# Patient Record
Sex: FEMALE | Race: WHITE | Employment: STUDENT | URBAN - METROPOLITAN AREA
[De-identification: names, ages, dates, MRNs, and addresses within clinical notes are randomized per-mention and may not be internally consistent; named-entity substitution may affect disease eponyms.]

---
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
## 2024-09-18 ENCOUNTER — OFFICE VISIT (OUTPATIENT)
Dept: URGENT CARE | Age: 19
End: 2024-09-18
Payer: COMMERCIAL

## 2024-09-18 VITALS
WEIGHT: 135 LBS | DIASTOLIC BLOOD PRESSURE: 69 MMHG | BODY MASS INDEX: 22.49 KG/M2 | HEIGHT: 65 IN | SYSTOLIC BLOOD PRESSURE: 110 MMHG | TEMPERATURE: 98.3 F | HEART RATE: 69 BPM | RESPIRATION RATE: 16 BRPM | OXYGEN SATURATION: 99 %

## 2024-09-18 DIAGNOSIS — J02.9 SORE THROAT: ICD-10-CM

## 2024-09-18 DIAGNOSIS — R59.1 LYMPHADENOPATHY: Primary | ICD-10-CM

## 2024-09-18 LAB — POC RAPID STREP: NEGATIVE

## 2024-09-18 RX ORDER — DEXAMETHASONE 6 MG/1
6 TABLET ORAL DAILY
Qty: 5 TABLET | Refills: 0 | Status: SHIPPED | OUTPATIENT
Start: 2024-09-18 | End: 2024-09-23

## 2024-09-18 RX ORDER — SERTRALINE HYDROCHLORIDE 50 MG/1
50 TABLET, FILM COATED ORAL DAILY
COMMUNITY

## 2024-09-18 RX ORDER — AMOXICILLIN 500 MG/1
500 CAPSULE ORAL 2 TIMES DAILY
Qty: 14 CAPSULE | Refills: 0 | Status: SHIPPED | OUTPATIENT
Start: 2024-09-18 | End: 2024-09-25

## 2024-09-18 ASSESSMENT — ENCOUNTER SYMPTOMS
SORE THROAT: 1
VOICE CHANGE: 0
FATIGUE: 1
URTICARIA: 1
HEADACHES: 1
TROUBLE SWALLOWING: 0

## 2024-09-18 NOTE — PROGRESS NOTES
"Subjective   Patient ID: Bonny Hung is a 19 y.o. female. They present today with a chief complaint of Sore Throat (Started 3 days ago ), Hives, Generalized Body Aches, and Headache.    History of Present Illness    Sore Throat   Associated symptoms include congestion and headaches. Pertinent negatives include no trouble swallowing.   Hives  Associated symptoms: congestion, fatigue, headaches and sore throat    Headache  Associated symptoms: congestion, fatigue and sore throat        Pt presents to urgent care with c/o sore throat, swollen glands, body aches, headache, intermittent itchy rash.  Pt denies CP, SOB, palpitations, fevers, abd pain, n/v/d, sick contacts, recent travel.        Past Medical History  Allergies as of 09/18/2024    (No Known Allergies)       (Not in a hospital admission)       No past medical history on file.    No past surgical history on file.     reports that she has never smoked. She has never been exposed to tobacco smoke. She has never used smokeless tobacco.    Review of Systems  Review of Systems   Constitutional:  Positive for fatigue.   HENT:  Positive for congestion and sore throat. Negative for trouble swallowing and voice change.    Neurological:  Positive for headaches.                                  Objective    Vitals:    09/18/24 1711   BP: 110/69   BP Location: Right arm   Patient Position: Sitting   BP Cuff Size: Adult   Pulse: 69   Resp: 16   Temp: 36.8 °C (98.3 °F)   SpO2: 99%   Weight: 61.2 kg (135 lb)   Height: 1.651 m (5' 5\")     Patient's last menstrual period was 09/18/2024 (exact date).    Physical Exam  Vitals and nursing note reviewed.   Constitutional:       General: She is not in acute distress.     Appearance: Normal appearance. She is not ill-appearing or toxic-appearing.   HENT:      Head: Atraumatic.      Right Ear: Tympanic membrane, ear canal and external ear normal.      Left Ear: Tympanic membrane, ear canal and external ear normal.      " Nose: Nose normal.      Mouth/Throat:      Mouth: Mucous membranes are moist.      Pharynx: Oropharynx is clear. Posterior oropharyngeal erythema present. No oropharyngeal exudate.   Eyes:      Extraocular Movements: Extraocular movements intact.      Conjunctiva/sclera: Conjunctivae normal.      Pupils: Pupils are equal, round, and reactive to light.   Cardiovascular:      Rate and Rhythm: Normal rate and regular rhythm.      Pulses: Normal pulses.      Heart sounds: Normal heart sounds.   Pulmonary:      Effort: Pulmonary effort is normal.      Breath sounds: Normal breath sounds.   Abdominal:      General: Abdomen is flat. Bowel sounds are normal.      Palpations: Abdomen is soft.      Tenderness: There is no abdominal tenderness.   Musculoskeletal:         General: Normal range of motion.      Cervical back: Normal range of motion and neck supple. No tenderness.   Skin:     General: Skin is warm and dry.      Capillary Refill: Capillary refill takes less than 2 seconds.   Neurological:      General: No focal deficit present.      Mental Status: She is alert and oriented to person, place, and time.   Psychiatric:         Mood and Affect: Mood normal.         Behavior: Behavior normal.         Thought Content: Thought content normal.         Procedures    Point of Care Test & Imaging Results from this visit  Results for orders placed or performed in visit on 09/18/24   POCT rapid strep A manually resulted   Result Value Ref Range    POC Rapid Strep Negative Negative      No results found.    Diagnostic study results (if any) were reviewed by MAGNUS Pelletier.    Assessment/Plan   Allergies, medications, history, and pertinent labs/EKGs/Imaging reviewed by MAGNUS Pelletier.     Medical Decision Making  Pt presents with headache, body aches, sore throat.  Posterior oropharynx with erythema, bilateral tonsillar swelling, no exudate.  Low suspicion for peritonsillar abscess given lack of unilateral  tonsillar swelling.  Pt with mildly swollen lymph nodes bilaterally in anterior neck.  No tenderness to palpation.  Rapid strep negative.  Given appearance of throat, CENTOR criteria, will treat for strep despite negative testing.  Will also prescribe steroids for inflammation.   At time of discharge patient was clinically well-appearing and HDS for outpatient management. The patient and/or family was educated regarding diagnosis, supportive care, OTC and Rx medications. The patient and/or family was given the opportunity to ask questions prior to discharge.  They verbalized understanding of my discussion of the plans for treatment, expected course, indications to return to  or seek further evaluation in ED, and the need for timely follow up as directed.   They were provided with a work/school excuse if requested.       Orders and Diagnoses  Diagnoses and all orders for this visit:  Lymphadenopathy  -     amoxicillin (Amoxil) 500 mg capsule; Take 1 capsule (500 mg) by mouth 2 times a day for 7 days.  -     dexAMETHasone (Decadron) 6 mg tablet; Take 1 tablet (6 mg) by mouth once daily for 5 days.  Sore throat  -     POCT rapid strep A manually resulted  -     amoxicillin (Amoxil) 500 mg capsule; Take 1 capsule (500 mg) by mouth 2 times a day for 7 days.  -     dexAMETHasone (Decadron) 6 mg tablet; Take 1 tablet (6 mg) by mouth once daily for 5 days.      Medical Admin Record      Patient disposition: Home    Electronically signed by MAGNUS Pelletier  6:22 PM